# Patient Record
Sex: MALE | Race: WHITE | NOT HISPANIC OR LATINO | URBAN - METROPOLITAN AREA
[De-identification: names, ages, dates, MRNs, and addresses within clinical notes are randomized per-mention and may not be internally consistent; named-entity substitution may affect disease eponyms.]

---

## 2020-11-19 ENCOUNTER — EMERGENCY (EMERGENCY)
Facility: HOSPITAL | Age: 35
LOS: 1 days | Discharge: HOME | End: 2020-11-19
Attending: EMERGENCY MEDICINE | Admitting: EMERGENCY MEDICINE
Payer: OTHER MISCELLANEOUS

## 2020-11-19 VITALS — DIASTOLIC BLOOD PRESSURE: 79 MMHG | HEART RATE: 77 BPM | SYSTOLIC BLOOD PRESSURE: 121 MMHG

## 2020-11-19 VITALS
HEIGHT: 74 IN | SYSTOLIC BLOOD PRESSURE: 126 MMHG | HEART RATE: 81 BPM | OXYGEN SATURATION: 95 % | RESPIRATION RATE: 16 BRPM | WEIGHT: 220.02 LBS | TEMPERATURE: 98 F | DIASTOLIC BLOOD PRESSURE: 74 MMHG
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
## 2020-11-19 DIAGNOSIS — M54.2 CERVICALGIA: ICD-10-CM

## 2020-11-19 DIAGNOSIS — S09.90XA UNSPECIFIED INJURY OF HEAD, INITIAL ENCOUNTER: ICD-10-CM
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
## 2020-11-19 DIAGNOSIS — M25.512 PAIN IN LEFT SHOULDER: ICD-10-CM
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
## 2020-11-19 DIAGNOSIS — M25.511 PAIN IN RIGHT SHOULDER: ICD-10-CM

## 2020-11-19 PROCEDURE — 99284 EMERGENCY DEPT VISIT MOD MDM: CPT

## 2020-11-19 PROCEDURE — 72125 CT NECK SPINE W/O DYE: CPT | Mod: 26

## 2020-11-19 PROCEDURE — 70450 CT HEAD/BRAIN W/O DYE: CPT | Mod: 26

## 2020-11-19 RX ORDER — ACETAMINOPHEN 500 MG
975 TABLET ORAL ONCE
Refills: 0 | Status: COMPLETED | OUTPATIENT
Start: 2020-11-19 | End: 2020-11-19

## 2020-11-19 RX ORDER — LIDOCAINE 4 G/100G
1 CREAM TOPICAL ONCE
Refills: 0 | Status: COMPLETED | OUTPATIENT
Start: 2020-11-19 | End: 2020-11-19

## 2020-11-19 RX ORDER — IBUPROFEN 200 MG
600 TABLET ORAL ONCE
Refills: 0 | Status: COMPLETED | OUTPATIENT
Start: 2020-11-19 | End: 2020-11-19

## 2020-11-19 RX ADMIN — Medication 975 MILLIGRAM(S): at 09:55

## 2020-11-19 RX ADMIN — LIDOCAINE 1 PATCH: 4 CREAM TOPICAL at 09:55

## 2020-11-19 RX ADMIN — Medication 600 MILLIGRAM(S): at 09:55
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
## 2020-11-19 NOTE — ED ADULT NURSE NOTE - NSIMPLEMENTINTERV_GEN_ALL_ED
Implemented All Universal Safety Interventions:  [place] to call system. Call bell, personal items and telephone within reach. Instruct patient to call for assistance. Room bathroom lighting operational. Non-slip footwear when patient is off stretcher. Physically safe environment: no spills, clutter or unnecessary equipment. Stretcher in lowest position, wheels locked, appropriate side rails in place.

## 2020-11-19 NOTE — ED PROVIDER NOTE - CLINICAL SUMMARY MEDICAL DECISION MAKING FREE TEXT BOX
34 yo M with no PMH presents after fall from standing, no LOC. scapular and neck TTP as above. Likely muscular strain, low concern for cervical fracture. Will control pain, reassess. No indication for imaging at this time 34 yo M with no PMH presents after fall from standing, no LOC. scapular and neck TTP as above. Likely muscular strain, low concern for cervical fracture. Will control pain, image head and c spine and reassess

## 2020-11-19 NOTE — ED PROVIDER NOTE - NSFOLLOWUPINSTRUCTIONS_ED_ALL_ED_FT
1. You were seen in the Emergency Room for fall at work  2. You may take ACETAMINOPHEN and IBUPROFEN as directed on packaging. Always follow medication instructions and read medication side effects. Stop using these medications if you have any concerns   3. Please follow up with your doctor in 24-48 hours.  4. Return to the emergency room or seek immediate assistance for any new or concerning symptoms (such as fevers, chills, headache that is new, worse or different, changes in strength or sensation or difficulty walking ), or if you get worse.   5. Copies of your tests were provided to you for follow-up.  You must address all your findings with your doctor. 1. You were seen in the Emergency Room for fall at work  2. You may take ACETAMINOPHEN and IBUPROFEN as directed on packaging. Always follow medication instructions and read medication side effects. Stop using these medications if you have any concerns   3. Please follow up with your doctor in 24-48 hours.  4. Return to the emergency room or seek immediate assistance for any new or concerning symptoms (such as fevers, chills, headache that is new, worse or different, changes in strength or sensation or difficulty walking ), or if you get worse.   5. Copies of your tests were provided to you for follow-up.  You must address all your findings with your doctor.      Head Injury    WHAT YOU NEED TO KNOW:    A head injury can include your scalp, face, skull, or brain and range from mild to severe. Effects can appear immediately after the injury or develop later. The effects may last a short time or be permanent. Healthcare providers may want to check your recovery over time. Treatment may change as you recover or develop new health problems from the head injury.    DISCHARGE INSTRUCTIONS:    Call your local emergency number (911 in the ), or have someone else call if:   •You cannot be woken.      •You have a seizure.      •You stop responding to others or you faint.      •You have blurry or double vision.      •Your speech becomes slurred or confused.      •You have arm or leg weakness, loss of feeling, or new problems with coordination.      •Your pupils are larger than usual, or one pupil is a different size than the other.      •You have blood or clear fluid coming out of your ears or nose.      Return to the emergency department if:   •You have repeated or forceful vomiting.      •You feel confused.      •Your headache gets worse or becomes severe.      •You or someone caring for you notices that you are harder to wake than usual.      Call your doctor if:   •Your symptoms last longer than 6 weeks after the injury.      •You have questions or concerns about your condition or care.      Medicines:   •Acetaminophen decreases pain and fever. It is available without a doctor's order. Ask how much to take and how often to take it. Follow directions. Read the labels of all other medicines you are using to see if they also contain acetaminophen, or ask your doctor or pharmacist. Acetaminophen can cause liver damage if not taken correctly. Do not use more than 4 grams (4,000 milligrams) total of acetaminophen in one day.       •Take your medicine as directed. Contact your healthcare provider if you think your medicine is not helping or if you have side effects. Tell him or her if you are allergic to any medicine. Keep a list of the medicines, vitamins, and herbs you take. Include the amounts, and when and why you take them. Bring the list or the pill bottles to follow-up visits. Carry your medicine list with you in case of an emergency.      Self-care:   •Rest or do quiet activities. Limit your time watching TV, using the computer, or doing tasks that require a lot of thinking. Slowly return to your normal activities as directed. Do not play sports or do activities that may cause you to get hit in the head. Ask your healthcare provider when you can return to sports.      •Apply ice on your head for 15 to 20 minutes every hour or as directed. Use an ice pack, or put crushed ice in a plastic bag. Cover it with a towel before you apply it to your skin. Ice helps prevent tissue damage and decreases swelling and pain.      •Have someone stay with you for 24 hours , or as directed. This person can monitor you for problems and call for help if needed. When you are awake, the person should ask you a few questions every few hours to see if you are thinking clearly. An example is to ask your name or address.      Prevent another head injury:   •Wear a helmet that fits properly. Do this when you play sports, or ride a bike, scooter, or skateboard. Helmets help decrease your risk for a serious head injury. Talk to your healthcare provider about other ways you can protect yourself if you play sports.      •Wear your seatbelt every time you are in a car. This helps lower your risk for a head injury if you are in a car accident.      Follow up with your doctor as directed: Write down your questions so you remember to ask them during your visits.
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
## 2020-11-19 NOTE — ED ADULT TRIAGE NOTE - BMI (KG/M2)
Has appt with [facility] on 2/11/19.  Would like a refill on her [facility] 28.2

## 2020-11-19 NOTE — ED PROVIDER NOTE - NS ED ROS FT
REVIEW OF SYSTEMS:  General:  no fever, no chills  HEENT: +neck pain  Cardiac: no chest pain  Respiratory: no cough, no shortness of breath  Gastrointestinal: no abdominal pain  Extremities: +b/l shoulder pain  -Carolynn Lindsey PGY-3

## 2020-11-19 NOTE — ED PROVIDER NOTE - PROGRESS NOTE DETAILS
Carolynn Lindsey MD PGY-3 CT head and c-spine unremarkable. patient offered motrin to be sent to pharmacy but states that he has it at home. will d/c with follow up instructions and return precautions
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
## 2020-11-19 NOTE — ED PROVIDER NOTE - PATIENT PORTAL LINK FT
You can access the FollowMyHealth Patient Portal offered by [facility] by registering at the following website: [email or web address]. By joining [employer]’s FollowMyHealth portal, you will also be able to view your health information using other applications (apps) compatible with our system.

## 2020-11-19 NOTE — ED PROVIDER NOTE - PHYSICAL EXAMINATION
PHYSICAL EXAM:   General: well-appearing, appears stated age, not in extremis   HEENT: NC/AT, PERRLA, EOMI without nystagmus, airway patent, midline and b/l cervical paraspinal neck TTP (R>L), b/l scapular TTP without overlying skin changes, no pain in shoulder with active ROM testing (empty can, pressing outward against resistance, placing hands behind back and lifting off back). Neck with full ROM with pain in paraspinal cervical regions  Cardiovascular: regular rate and rhythm, + S1/S2, no murmurs, rubs, gallops appreciated  Respiratory: clear to auscultation bilaterally, good aeration bilaterally, nonlabored respirations  Abdominal: soft, nontender, nondistended, no rebound, guarding or rigidity  Back: no midline thoracic, lumbar or sacral spinal TTP, no ecchymosis or lesios noted  Neuro: Alert and oriented x3. CN2-12 intact, Strength 5/5 in upper and lower extremities. Sensation intact to light touch in upper and lower extremities. Gait WNL, finger-to-nose WNL.   Psychiatric: appropriate mood and affect.   Skin: appropriate color, warm, dry.   -Carolynn Lindsey PGY-3 PHYSICAL EXAM:   General: well-appearing, appears stated age, not in extremis   HEENT: NC/AT, PERRLA, EOMI without nystagmus, airway patent, b/l cervical paraspinal neck TTP (R>L), b/l scapular TTP without overlying skin changes, no pain in shoulder with active ROM testing (empty can, pressing outward against resistance, placing hands behind back and lifting off back) but endorses some pain in neck with pressing outward against resistance. Neck with full ROM with pain in paraspinal cervical regions  Cardiovascular: regular rate and rhythm, + S1/S2, no murmurs, rubs, gallops appreciated  Respiratory: clear to auscultation bilaterally, good aeration bilaterally, nonlabored respirations  Abdominal: soft, nontender, nondistended, no rebound, guarding or rigidity  Back: no midline thoracic, lumbar or sacral spinal TTP, no ecchymosis or lesios noted  Neuro: Alert and oriented x3. CN2-12 intact, Strength 5/5 in upper and lower extremities. Sensation intact to light touch in upper and lower extremities. Gait WNL, finger-to-nose WNL.   Psychiatric: appropriate mood and affect.   Skin: appropriate color, warm, dry.   -Carolynn Lindsey PGY-3 PHYSICAL EXAM:   General: well-appearing, appears stated age, not in extremis   HEENT: NC/AT, PERRLA, EOMI without nystagmus, airway patent, b/l cervical paraspinal neck TTP (R>L), b/l scapular TTP without overlying skin changes, no pain in shoulder with active ROM testing (empty can, pressing outward against resistance, placing hands behind back and lifting off back) but endorses some pain in neck with pressing outward of upper arms against resistance. Neck with full ROM with pain in paraspinal cervical regions  Cardiovascular: regular rate and rhythm, + S1/S2, no murmurs, rubs, gallops appreciated  Respiratory: clear to auscultation bilaterally, good aeration bilaterally, nonlabored respirations  Abdominal: soft, nontender, nondistended, no rebound, guarding or rigidity  Back: no midline thoracic, lumbar or sacral spinal TTP, no ecchymosis or lesios noted  Neuro: Alert and oriented x3. CN2-12 intact, Strength 5/5 in upper and lower extremities. Sensation intact to light touch in upper and lower extremities. Gait WNL, finger-to-nose WNL.   Psychiatric: appropriate mood and affect.   Skin: appropriate color, warm, dry.   -Carolynn Lindsey PGY-3
